# Patient Record
Sex: FEMALE | Race: WHITE | NOT HISPANIC OR LATINO | URBAN - METROPOLITAN AREA
[De-identification: names, ages, dates, MRNs, and addresses within clinical notes are randomized per-mention and may not be internally consistent; named-entity substitution may affect disease eponyms.]

---

## 2019-06-11 ENCOUNTER — CONVERSION ENCOUNTER (OUTPATIENT)
Dept: FAMILY MEDICINE CLINIC | Facility: CLINIC | Age: 54
End: 2019-06-11

## 2019-06-29 VITALS
BODY MASS INDEX: 26.82 KG/M2 | OXYGEN SATURATION: 97 % | RESPIRATION RATE: 14 BRPM | HEART RATE: 69 BPM | SYSTOLIC BLOOD PRESSURE: 123 MMHG | WEIGHT: 161 LBS | DIASTOLIC BLOOD PRESSURE: 86 MMHG | HEIGHT: 65 IN

## 2019-06-29 NOTE — PROGRESS NOTES
Referring Provider:  Maye Jones DO  Primary Provider:  Robert BYRNE DO    CC:  establish care .    History of Present Illness:  55 y/o C female here to [facility]    Past Medical History:     Reviewed history and no changes required:        Stage 4 Prolapse Uterine/ Bladder    Past Surgical History:     Reviewed history and no changes required:        TVH  --  Dr. Sundar Sinha---- NEG =         Mammo---- Declines         DEXA----        Cologuard/ Conoscopy----                OPTHO---  [facility]      Vital Signs:    Patient Profile:    54 Years Old Female  Height:     65 inches  Weight:     161 pounds  BMI:        26.79     BSA:        1.81  O2 Sat:     97 %  Temp:       98.2 degrees F oral  Pulse rate: 69 / minute  Pulse rhythm:   regular  Resp:       14 per minute  BP Sittin / 86  (right arm)    Cuff size:  regular      Problems: Active problems were reviewed with the patient during this visit.  Medications: Medications were reviewed with the patient during this visit.  Allergies: Allergies were reviewed with the patient during this visit.  No Known Allergy.        Vitals Entered By: Raquel Baron CMA (2019 10:01 AM)    Past History  Past Medical History (reviewed - no changes required): Stage 4 Prolapse Uterine/ Bladder  Surgical History (reviewed - no changes required): TV  --  Dr. Sundar Sinha---- NEG =   Mammo---- Declines   DEXA----  Cologuard/ Conoscopy----    OPTHO---  [facility]  Family History (reviewed - no changes required): Mom--- @ 78y/o ?MI  Dad----DMII/ HTN  Bro---OA Knee/ Lipomas  Sis---HTN  Social History (reviewed - no changes required): NO TOB/ ILLICITS/ ETOH    FLU---Declines.     Family History Summary:      Reviewed history and no changes required: 2019      General Comments - FH:  Mom--- @ 78y/o ?MI  Dad----DMII/ HTN  Bro---OA Knee/ Lipomas  Sis---HTN    Social History:     Reviewed history and no  changes required:        NO TOB/ ILLICITS/ ETOH                FLU---Declines.       Risk Factors:     Smoked Tobacco Use:  Never smoker  Smokeless Tobacco Use:  Never  HIV high-risk behavior:  no  Caffeine use:  4 drinks per day  Alcohol use:  no  Exercise:  no  Seatbelt use:  100 %  Sun Exposure:  frequently    Family History Risk Factors:     Family History of MI in females < 65 years old:  no     Family History of MI in males < 55 years old:  no    PAP Smear History:      Date of Last PAP Smear:  04/01/2019     Results:  Normal     Previous Tobacco Use:   Previous Alcohol Use:   PAP Smear History:      Date of Last PAP Smear:  04/01/2019     Results:  Normal         Review of Systems     MS       Complains of joint pain, joint swelling, arthritis and decreased ROM.       Denies joint redness.    Derm       Complains of suspicious lesions.      Physical Exam    General:      well developed, well nourished, in no acute distress.    Head:      + mobile subcut 1cm mass @ top of post scalp; Skin colored 2-3mm sessile round mass at Left post occ.  Lungs:      clear bilaterally to auscultation.  No R/R/W  Heart:      regular rhythm, normal rate and no murmurs.    Msk:      +Left knee w/ swelling at Left distal lateral femur and left lateral infra patellar region w/ TTP at Prox swelling  NO erythema/ ecchymosis  Extremities:      Rt foot Digit #2 w/ increased flexion/ medial diversion and medial callous; Mild Rt foot Digit #1 bunion   Neurologic:      CN II-XII gross intact.    Psych:      alert and cooperative; normal mood and affect; normal attention span and concentration.        Blood Pressure:  Today's BP: 123/86 mm Hg          Impression & Recommendations:    Problem # 1:  Knee joint pain, left (ICD-719.46) (BMH20-E24.562)    Problem # 2:  Nevi, multiple (ICD-216.9) (ABY14-U21.9)    Problem # 3:  Family history diabetes 1st degree relative (ICD-V18.0) (ALD83-N36.3)    Orders:  [facility] COMPREHENSIVE METABOLIC PANEL  (CMP) (MPC)      Medications Added to Medication List This Visit:  1)  Stem Enhance  .... 2 po qday  2)  Super B Complex  .... 1 po qday  3)  Mvi  .... 1 po qday  4)  Msm  .... 2 tsp po qday  5)  Collagen Powder  .... 1 tbsp qday    Other Orders:  H LIPID PANEL (LIPID)                    Medication Administration    Orders Added:  1)  [facility] LIPID PANEL [LIPID]  2)  [facility] COMPREHENSIVE METABOLIC PANEL (CMP) [MPC]  3)  [id number] Vst-New Level II [CPT-47013]  ][Hx-Social]          Electronically signed by Maye Jones DO on 06/29/2019 at 10:46 AM  ________________________________________________________________________       Disclaimer: Converted Note message may not contain all data elements that existed in the legacy source system. Please see [employer] System for the original note details.

## 2020-05-14 ENCOUNTER — OFFICE VISIT (OUTPATIENT)
Dept: FAMILY MEDICINE CLINIC | Facility: CLINIC | Age: 55
End: 2020-05-14

## 2020-05-14 VITALS
DIASTOLIC BLOOD PRESSURE: 95 MMHG | SYSTOLIC BLOOD PRESSURE: 149 MMHG | RESPIRATION RATE: 16 BRPM | HEIGHT: 65 IN | HEART RATE: 74 BPM | WEIGHT: 159 LBS | TEMPERATURE: 98.4 F | OXYGEN SATURATION: 98 % | BODY MASS INDEX: 26.49 KG/M2

## 2020-05-14 DIAGNOSIS — R03.0 ELEVATED BLOOD-PRESSURE READING WITHOUT DIAGNOSIS OF HYPERTENSION: ICD-10-CM

## 2020-05-14 DIAGNOSIS — H66.001 ACUTE SUPPURATIVE OTITIS MEDIA OF RIGHT EAR WITHOUT SPONTANEOUS RUPTURE OF TYMPANIC MEMBRANE, RECURRENCE NOT SPECIFIED: Primary | ICD-10-CM

## 2020-05-14 PROCEDURE — 99212 OFFICE O/P EST SF 10 MIN: CPT | Performed by: FAMILY MEDICINE

## 2020-05-14 PROCEDURE — 69209 REMOVE IMPACTED EAR WAX UNI: CPT | Performed by: FAMILY MEDICINE

## 2020-05-14 RX ORDER — FERROUS SULFATE 325(65) MG
325 TABLET ORAL DAILY PRN
Status: SHIPPED
Start: 2020-05-14

## 2020-05-14 RX ORDER — AMOXICILLIN 875 MG/1
875 TABLET, COATED ORAL 2 TIMES DAILY
Qty: 20 TABLET | Refills: 0 | Status: SHIPPED | OUTPATIENT
Start: 2020-05-14 | End: 2020-06-04

## 2020-05-14 RX ORDER — FERROUS SULFATE 325(65) MG
2 TABLET ORAL DAILY
COMMUNITY
End: 2020-05-14

## 2020-05-14 NOTE — PROGRESS NOTES
Subjective   Thalia Monahan is a 55 y.o. female.     Chief Complaint   Patient presents with   • Earache     right ear pain and hearing loss x1week          Current Outpatient Medications:   •  ferrous sulfate 325 (65 FE) MG tablet, Take 1 tablet by mouth Daily As Needed., Disp: , Rfl:   •  Multiple Vitamin (MULTI-VITAMIN DAILY PO), Take 1 tablet by mouth Daily., Disp: , Rfl:   •  SUPER B COMPLEX/C PO, Take 1 tablet by mouth., Disp: , Rfl:   •  amoxicillin (AMOXIL) 875 MG tablet, Take 1 tablet by mouth 2 (Two) Times a Day., Disp: 20 tablet, Rfl: 0    History reviewed. No pertinent past medical history.    Past Surgical History:   Procedure Laterality Date   • HYSTERECTOMY  04/2019   • VAGINAL PROLAPSE REPAIR  2019       Family History   Problem Relation Age of Onset   • Heart disease Mother    • Cancer Mother         ovarian or uterus cancer   • Diabetes Father        Social History     Socioeconomic History   • Marital status:      Spouse name: Not on file   • Number of children: Not on file   • Years of education: Not on file   • Highest education level: Not on file   Tobacco Use   • Smoking status: Never Smoker   • Smokeless tobacco: Never Used   Substance and Sexual Activity   • Alcohol use: Never     Frequency: Never   • Drug use: Never   • Sexual activity: Defer           Rt ear pain started 1 wk ago and felt TTP....the patient tried a QTip w/ alcohol and removed some black colored debris; Pt tried an otc homeopathic ear drop x 2 days but then it started throbbing on Sun but by Mon it was fine-----pt was using cotton qday but when she removed it, she noticed she cant hear very well       The following portions of the patient's history were reviewed and updated as appropriate: allergies, current medications, past family history, past medical history, past social history, past surgical history and problem list.    Review of Systems    Vitals:    05/14/20 0842   BP: 149/95   Pulse: 74   Resp: 16    Temp: 98.4 °F (36.9 °C)   SpO2: 98%       Objective   Physical Exam   Constitutional: She is oriented to person, place, and time. She appears well-developed and well-nourished. No distress.   HENT:   Head: Normocephalic and atraumatic.   Right Ear: External ear normal. There is tenderness. Tympanic membrane is erythematous. Decreased hearing is noted. cerumen impaction is present.  Left Ear: Tympanic membrane, external ear and ear canal normal.   Eyes: Conjunctivae and EOM are normal. Right eye exhibits no discharge. Left eye exhibits no discharge. No scleral icterus.   Neck: Normal range of motion. Neck supple.   Cardiovascular:   No murmur heard.  Lymphadenopathy:     She has no cervical adenopathy.   Neurological: She is alert and oriented to person, place, and time. No cranial nerve deficit.   Skin: Skin is warm and dry. No rash noted. She is not diaphoretic. No erythema. No pallor.   Psychiatric: She has a normal mood and affect. Her behavior is normal. Judgment and thought content normal.   Nursing note and vitals reviewed.      Assessment/Plan   Thalia was seen today for earache.    Diagnoses and all orders for this visit:    Acute suppurative otitis media of right ear without spontaneous rupture of tympanic membrane, recurrence not specified    Elevated blood-pressure reading without diagnosis of hypertension    Other orders  -     amoxicillin (AMOXIL) 875 MG tablet; Take 1 tablet by mouth 2 (Two) Times a Day.  -     ferrous sulfate 325 (65 FE) MG tablet; Take 1 tablet by mouth Daily As Needed.        Pt to not use any more drops and take oral abx; cotton in Rt EAC prn for comfort  Will f/u on BP at f/u appt on Ear infection

## 2020-05-17 PROBLEM — R03.0 ELEVATED BLOOD-PRESSURE READING WITHOUT DIAGNOSIS OF HYPERTENSION: Status: ACTIVE | Noted: 2020-05-17

## 2020-05-17 PROBLEM — H66.001 NON-RECURRENT ACUTE SUPPURATIVE OTITIS MEDIA OF RIGHT EAR WITHOUT SPONTANEOUS RUPTURE OF TYMPANIC MEMBRANE: Status: ACTIVE | Noted: 2020-05-17

## 2020-06-04 ENCOUNTER — OFFICE VISIT (OUTPATIENT)
Dept: FAMILY MEDICINE CLINIC | Facility: CLINIC | Age: 55
End: 2020-06-04

## 2020-06-04 VITALS
OXYGEN SATURATION: 97 % | SYSTOLIC BLOOD PRESSURE: 131 MMHG | RESPIRATION RATE: 16 BRPM | TEMPERATURE: 97.8 F | HEART RATE: 73 BPM | HEIGHT: 65 IN | WEIGHT: 159 LBS | DIASTOLIC BLOOD PRESSURE: 85 MMHG | BODY MASS INDEX: 26.49 KG/M2

## 2020-06-04 DIAGNOSIS — Z13.1 SCREENING FOR DIABETES MELLITUS: ICD-10-CM

## 2020-06-04 DIAGNOSIS — Z13.220 SCREENING FOR LIPOID DISORDERS: ICD-10-CM

## 2020-06-04 DIAGNOSIS — H66.001 NON-RECURRENT ACUTE SUPPURATIVE OTITIS MEDIA OF RIGHT EAR WITHOUT SPONTANEOUS RUPTURE OF TYMPANIC MEMBRANE: Primary | ICD-10-CM

## 2020-06-04 PROCEDURE — 99212 OFFICE O/P EST SF 10 MIN: CPT | Performed by: FAMILY MEDICINE

## 2020-06-04 NOTE — PROGRESS NOTES
Subjective   Thalia Monahan is a 55 y.o. female.     Chief Complaint   Patient presents with   • Otitis Media         Current Outpatient Medications:   •  ferrous sulfate 325 (65 FE) MG tablet, Take 1 tablet by mouth Daily As Needed., Disp: , Rfl:   •  Multiple Vitamin (MULTI-VITAMIN DAILY PO), Take 1 tablet by mouth Daily., Disp: , Rfl:   •  SUPER B COMPLEX/C PO, Take 1 tablet by mouth., Disp: , Rfl:     Past Medical History:   Diagnosis Date   • MAMMO     Declines   • PAP     NEG = 2018    JFW       Past Surgical History:   Procedure Laterality Date   • HYSTERECTOMY  04/2019   • VAGINAL PROLAPSE REPAIR  2019       Family History   Problem Relation Age of Onset   • Heart disease Mother    • Cancer Mother         ovarian or uterus cancer   • Diabetes Father    • Arthritis Sister        Social History     Socioeconomic History   • Marital status:      Spouse name: Not on file   • Number of children: Not on file   • Years of education: Not on file   • Highest education level: Not on file   Tobacco Use   • Smoking status: Never Smoker   • Smokeless tobacco: Never Used   Substance and Sexual Activity   • Alcohol use: Never     Frequency: Never   • Drug use: Never   • Sexual activity: Defer       54 y/o C female here for f/u on Rt OM....    Pt states she finished the abx and now just needing her Rt TM checked----pt feels it is better and can hear better in general              The following portions of the patient's history were reviewed and updated as appropriate: allergies, current medications, past family history, past medical history, past social history, past surgical history and problem list.    Review of Systems   Constitutional: Negative for activity change, appetite change and fever.   HENT: Positive for ear discharge (off/on and pruitic). Negative for ear pain.        Vitals:    06/04/20 0813   BP: 131/85   Pulse: 73   Resp: 16   Temp: 97.8 °F (36.6 °C)   SpO2: 97%       Objective   Physical Exam    Constitutional: She is oriented to person, place, and time. She appears well-developed and well-nourished. No distress.   HENT:   Head: Normocephalic and atraumatic.   Right Ear: External ear and ear canal normal. No drainage, swelling or tenderness. Tympanic membrane is erythematous (improved w/ decreased erythema). Tympanic membrane is not perforated. cerumen impaction is not present.  Left Ear: External ear and ear canal normal. No drainage, swelling or tenderness. Tympanic membrane is not perforated and not erythematous. An impacted cerumen is not present.  Neurological: She is alert and oriented to person, place, and time. No cranial nerve deficit.   Psychiatric: She has a normal mood and affect. Her behavior is normal. Judgment and thought content normal.   Nursing note and vitals reviewed.        Assessment/Plan   Thalia was seen today for otitis media.    Diagnoses and all orders for this visit:    Non-recurrent acute suppurative otitis media of right ear without spontaneous rupture of tympanic membrane    Screening for lipoid disorders  -     Lipid Panel; Future  -     Comprehensive Metabolic Panel; Future    Screening for diabetes mellitus  -     Comprehensive Metabolic Panel; Future    PT to use otc alcohol in EAC prn increased moisture    Disc'd screening labs/ mammo for age w/ pt

## 2025-05-24 ENCOUNTER — APPOINTMENT (OUTPATIENT)
Dept: ULTRASOUND IMAGING | Facility: HOSPITAL | Age: 60
End: 2025-05-24

## 2025-05-24 ENCOUNTER — HOSPITAL ENCOUNTER (EMERGENCY)
Facility: HOSPITAL | Age: 60
Discharge: HOME | End: 2025-05-24
Attending: EMERGENCY MEDICINE

## 2025-05-24 ENCOUNTER — APPOINTMENT (OUTPATIENT)
Dept: GENERAL RADIOLOGY | Facility: HOSPITAL | Age: 60
End: 2025-05-24

## 2025-05-24 VITALS
RESPIRATION RATE: 16 BRPM | DIASTOLIC BLOOD PRESSURE: 94 MMHG | OXYGEN SATURATION: 97 % | BODY MASS INDEX: 26.93 KG/M2 | TEMPERATURE: 98.3 F | SYSTOLIC BLOOD PRESSURE: 162 MMHG | HEIGHT: 66 IN | WEIGHT: 167.6 LBS | HEART RATE: 85 BPM

## 2025-05-24 DIAGNOSIS — M25.571 ACUTE RIGHT ANKLE PAIN: ICD-10-CM

## 2025-05-24 DIAGNOSIS — M25.561 ACUTE PAIN OF RIGHT KNEE: Primary | ICD-10-CM

## 2025-05-24 DIAGNOSIS — M79.89 RIGHT LEG SWELLING: ICD-10-CM

## 2025-05-24 LAB
ALBUMIN SERPL-MCNC: 4.4 G/DL (ref 3.5–5.2)
ALBUMIN/GLOB SERPL: 2.1 G/DL
ALP SERPL-CCNC: 72 U/L (ref 39–117)
ALT SERPL W P-5'-P-CCNC: 11 U/L (ref 1–33)
ANION GAP SERPL CALCULATED.3IONS-SCNC: 10.2 MMOL/L (ref 5–15)
AST SERPL-CCNC: 16 U/L (ref 1–32)
BASOPHILS # BLD AUTO: 0.05 10*3/MM3 (ref 0–0.2)
BASOPHILS NFR BLD AUTO: 0.7 % (ref 0–1.5)
BILIRUB SERPL-MCNC: 0.4 MG/DL (ref 0–1.2)
BUN SERPL-MCNC: 17 MG/DL (ref 8–23)
BUN/CREAT SERPL: 29.8 (ref 7–25)
CALCIUM SPEC-SCNC: 9.3 MG/DL (ref 8.6–10.5)
CHLORIDE SERPL-SCNC: 97 MMOL/L (ref 98–107)
CO2 SERPL-SCNC: 26.8 MMOL/L (ref 22–29)
CREAT SERPL-MCNC: 0.57 MG/DL (ref 0.57–1)
DEPRECATED RDW RBC AUTO: 41.7 FL (ref 37–54)
EGFRCR SERPLBLD CKD-EPI 2021: 102.9 ML/MIN/1.73
EOSINOPHIL # BLD AUTO: 0.09 10*3/MM3 (ref 0–0.4)
EOSINOPHIL NFR BLD AUTO: 1.2 % (ref 0.3–6.2)
ERYTHROCYTE [DISTWIDTH] IN BLOOD BY AUTOMATED COUNT: 12.7 % (ref 12.3–15.4)
GLOBULIN UR ELPH-MCNC: 2.1 GM/DL
GLUCOSE SERPL-MCNC: 97 MG/DL (ref 65–99)
HCT VFR BLD AUTO: 40.6 % (ref 34–46.6)
HGB BLD-MCNC: 13.5 G/DL (ref 12–15.9)
IMM GRANULOCYTES # BLD AUTO: 0.01 10*3/MM3 (ref 0–0.05)
IMM GRANULOCYTES NFR BLD AUTO: 0.1 % (ref 0–0.5)
LYMPHOCYTES # BLD AUTO: 1.37 10*3/MM3 (ref 0.7–3.1)
LYMPHOCYTES NFR BLD AUTO: 18.6 % (ref 19.6–45.3)
MCH RBC QN AUTO: 29.9 PG (ref 26.6–33)
MCHC RBC AUTO-ENTMCNC: 33.3 G/DL (ref 31.5–35.7)
MCV RBC AUTO: 90 FL (ref 79–97)
MONOCYTES # BLD AUTO: 0.41 10*3/MM3 (ref 0.1–0.9)
MONOCYTES NFR BLD AUTO: 5.6 % (ref 5–12)
NEUTROPHILS NFR BLD AUTO: 5.44 10*3/MM3 (ref 1.7–7)
NEUTROPHILS NFR BLD AUTO: 73.8 % (ref 42.7–76)
NT-PROBNP SERPL-MCNC: 192.1 PG/ML (ref 0–900)
PLATELET # BLD AUTO: 240 10*3/MM3 (ref 140–450)
PMV BLD AUTO: 9.4 FL (ref 6–12)
POTASSIUM SERPL-SCNC: 3.6 MMOL/L (ref 3.5–5.2)
PROT SERPL-MCNC: 6.5 G/DL (ref 6–8.5)
RBC # BLD AUTO: 4.51 10*6/MM3 (ref 3.77–5.28)
SODIUM SERPL-SCNC: 134 MMOL/L (ref 136–145)
WBC NRBC COR # BLD AUTO: 7.37 10*3/MM3 (ref 3.4–10.8)

## 2025-05-24 PROCEDURE — 85025 COMPLETE CBC W/AUTO DIFF WBC: CPT

## 2025-05-24 PROCEDURE — 99283 EMERGENCY DEPT VISIT LOW MDM: CPT

## 2025-05-24 PROCEDURE — 99284 EMERGENCY DEPT VISIT MOD MDM: CPT

## 2025-05-24 PROCEDURE — 80053 COMPREHEN METABOLIC PANEL: CPT

## 2025-05-24 PROCEDURE — 73610 X-RAY EXAM OF ANKLE: CPT

## 2025-05-24 PROCEDURE — 83880 ASSAY OF NATRIURETIC PEPTIDE: CPT

## 2025-05-24 PROCEDURE — 93971 EXTREMITY STUDY: CPT

## 2025-05-24 PROCEDURE — 73562 X-RAY EXAM OF KNEE 3: CPT

## 2025-05-24 NOTE — DISCHARGE INSTRUCTIONS
Consider wearing compression stockings to help reduce swelling.    Elevate your leg several times a day.    Wear knee brace for stability.    Call and schedule follow-up appointment with your primary care provider.    Call schedule follow-up appointment with your orthopedic.    Return to the ER with any new or worsening symptoms.

## 2025-05-24 NOTE — FSED PROVIDER NOTE
Subjective   History of Present Illness  Patient is a 60-year-old female with history of osteoarthritis who presents today with right leg swelling, right knee pain, and right ankle pain.  She denies any known injury.  She reports that she was concerned for a DVT.        Review of Systems    Past Medical History:   Diagnosis Date    MAMMO     Declines    PAP     NEG = 2018    JFW       No Known Allergies    Past Surgical History:   Procedure Laterality Date    HYSTERECTOMY  04/2019    VAGINAL PROLAPSE REPAIR  2019       Family History   Problem Relation Age of Onset    Heart disease Mother     Cancer Mother         ovarian or uterus cancer    Diabetes Father     Arthritis Sister        Social History     Socioeconomic History    Marital status:    Tobacco Use    Smoking status: Never    Smokeless tobacco: Never   Substance and Sexual Activity    Alcohol use: Never    Drug use: Never    Sexual activity: Defer           Objective   Physical Exam  Vitals and nursing note reviewed.   Constitutional:       General: She is not in acute distress.     Appearance: Normal appearance. She is not ill-appearing, toxic-appearing or diaphoretic.   HENT:      Head: Normocephalic.      Nose: Nose normal.      Mouth/Throat:      Mouth: Mucous membranes are moist.   Eyes:      Conjunctiva/sclera: Conjunctivae normal.   Cardiovascular:      Rate and Rhythm: Normal rate and regular rhythm.      Pulses: Normal pulses.      Heart sounds: Normal heart sounds.   Pulmonary:      Effort: Pulmonary effort is normal.      Breath sounds: Normal breath sounds.   Musculoskeletal:         General: Swelling present. No signs of injury. Normal range of motion.      Cervical back: Normal range of motion.   Skin:     Findings: No bruising or erythema.   Neurological:      General: No focal deficit present.      Mental Status: She is alert and oriented to person, place, and time.   Psychiatric:         Mood and Affect: Mood normal.          Behavior: Behavior normal.         Thought Content: Thought content normal.         Judgment: Judgment normal.         Procedures           ED Course  ED Course as of 05/24/25 1706   Sat May 24, 2025   1555 WBC: 7.37 [CW]   1555 Hemoglobin: 13.5 [CW]   1555 Platelets: 240 [CW]   1706 proBNP: 192.1 [CW]   1706 Glucose: 97 [CW]   1706 BUN: 17 [CW]   1706 Creatinine: 0.57 [CW]   1706 Sodium(!): 134 [CW]   1706 Potassium: 3.6 [CW]   1706 Chloride(!): 97 [CW]   1706 ALT (SGPT): 11 [CW]   1706 AST (SGOT): 16 [CW]   1706 BUN/Creatinine Ratio(!): 29.8 [CW]   1706 Anion Gap: 10.2 [CW]   1706 eGFR: 102.9 [CW]      ED Course User Index  [CW] Sera Lockhart, APRN                                           Medical Decision Making  Patient is a 60-year-old female with history of osteoarthritis who presents today with right leg swelling, right knee pain, and right ankle pain.  She denies any known injury.  She reports that she was concerned for a DVT.    Upon exam patient is awake and alert, nontoxic-appearing, appears in no acute distress, and is answering questions appropriately.  Lung sounds are clear and equal bilaterally.  Heart is normal rate and rhythm.  Mucous membranes are moist.  Patient reports right knee pain.  There is a mild amount of edema present.  There is no erythema, bruising or signs of trauma present.  She has a moderate amount of swelling to her right calf.  There is no erythema, bruising or signs of trauma present.  She has edema to her right ankle, no erythema, bruising or signs of trauma present.  She has full range of motion to her right knee, and ankle.  Strong pedal pulse.  An x-ray of her ankle and knee were obtained and showed nothing acute.  Labs that consisted of a CBC, CMP and BNP were obtained and showed nothing acute.  The venous Doppler of her right lower extremity was obtained and showed a possible Baker's cyst behind right knee otherwise there was no signs of a DVT present.  I advised her  to follow-up with an orthopedic for further evaluation if symptoms persist, and her primary care provider.  Advised her to return to the ER with any new or worsening symptoms.    Problems Addressed:  Acute pain of right knee: complicated acute illness or injury  Acute right ankle pain: complicated acute illness or injury  Right leg swelling: complicated acute illness or injury    Amount and/or Complexity of Data Reviewed  Labs: ordered. Decision-making details documented in ED Course.  Radiology: ordered.        Final diagnoses:   Acute pain of right knee   Right leg swelling   Acute right ankle pain       ED Disposition  ED Disposition       ED Disposition   Discharge    Condition   Stable    Comment   --               Maye Jones DO  [address]  [address]  [facility] [address]  [phone]          [facility] [place] [id number]  [phone]  Schedule an appointment as soon as possible for a visit            Medication List      No changes were made to your prescriptions during this visit.